# Patient Record
Sex: MALE | Race: WHITE | NOT HISPANIC OR LATINO | Employment: OTHER | URBAN - METROPOLITAN AREA
[De-identification: names, ages, dates, MRNs, and addresses within clinical notes are randomized per-mention and may not be internally consistent; named-entity substitution may affect disease eponyms.]

---

## 2020-03-09 ENCOUNTER — TRANSCRIBE ORDERS (OUTPATIENT)
Dept: ADMINISTRATIVE | Facility: HOSPITAL | Age: 45
End: 2020-03-09

## 2020-03-09 ENCOUNTER — HOSPITAL ENCOUNTER (OUTPATIENT)
Dept: GENERAL RADIOLOGY | Facility: HOSPITAL | Age: 45
Discharge: HOME | End: 2020-03-09
Admitting: NURSE PRACTITIONER

## 2020-03-09 DIAGNOSIS — M75.22 BICIPITAL TENDONITIS OF LEFT SHOULDER: Primary | ICD-10-CM

## 2020-03-09 DIAGNOSIS — M75.22 BICIPITAL TENDONITIS OF LEFT SHOULDER: ICD-10-CM

## 2020-03-09 PROCEDURE — 73030 X-RAY EXAM OF SHOULDER: CPT

## 2022-01-17 ENCOUNTER — OFFICE VISIT (OUTPATIENT)
Dept: NEUROSURGERY | Facility: CLINIC | Age: 47
End: 2022-01-17

## 2022-01-17 VITALS — RESPIRATION RATE: 18 BRPM | WEIGHT: 182.2 LBS | HEIGHT: 68 IN | BODY MASS INDEX: 27.61 KG/M2

## 2022-01-17 DIAGNOSIS — G89.29 CHRONIC NECK PAIN: Primary | ICD-10-CM

## 2022-01-17 DIAGNOSIS — M54.2 CHRONIC NECK PAIN: Primary | ICD-10-CM

## 2022-01-17 PROCEDURE — 99203 OFFICE O/P NEW LOW 30 MIN: CPT | Performed by: NEUROLOGICAL SURGERY

## 2022-01-17 NOTE — PROGRESS NOTES
Subjective     Chief Complaint: ***    Patient ID: Paul Vargas is a 46 y.o. male {Specialty - why patient here?:95552}    History of Present Illness    ***    The following portions of the patient's history were reviewed and updated as appropriate: allergies, current medications, past family history, past medical history, past social history, past surgical history and problem list.    Family history:   Family History   Problem Relation Age of Onset   • Fibromyalgia Mother    • Heart disease Mother    • Hypertension Mother    • Cancer Father    • Drug abuse Sister        Social history:   Social History     Socioeconomic History   • Marital status:    Tobacco Use   • Smoking status: Former Smoker   • Smokeless tobacco: Never Used   Vaping Use   • Vaping Use: Never used   Substance and Sexual Activity   • Alcohol use: Never   • Drug use: Yes     Types: Marijuana     Comment: recreational    • Sexual activity: Defer       Review of Systems   Constitutional: Negative for activity change, appetite change, chills, diaphoresis, fatigue, fever and unexpected weight change.   HENT: Negative for congestion, dental problem, drooling, ear discharge, ear pain, facial swelling, hearing loss, mouth sores, nosebleeds, postnasal drip, rhinorrhea, sinus pressure, sinus pain, sneezing, sore throat, tinnitus, trouble swallowing and voice change.    Eyes: Positive for visual disturbance. Negative for photophobia, pain, discharge, redness and itching.   Respiratory: Negative for apnea, cough, choking, chest tightness, shortness of breath, wheezing and stridor.    Cardiovascular: Negative for chest pain, palpitations and leg swelling.   Gastrointestinal: Negative for abdominal distention, abdominal pain, anal bleeding, blood in stool, constipation, diarrhea, nausea, rectal pain and vomiting.   Endocrine: Negative for cold intolerance, heat intolerance, polydipsia, polyphagia and polyuria.   Genitourinary: Negative for decreased  "urine volume, difficulty urinating, dysuria, enuresis, flank pain, frequency, genital sores, hematuria and urgency.   Musculoskeletal: Positive for back pain, myalgias, neck pain and neck stiffness. Negative for arthralgias, gait problem and joint swelling.   Skin: Negative for color change, pallor, rash and wound.   Allergic/Immunologic: Negative for environmental allergies, food allergies and immunocompromised state.   Neurological: Positive for numbness and headaches. Negative for dizziness, tremors, seizures, syncope, facial asymmetry, speech difficulty, weakness and light-headedness.   Hematological: Negative for adenopathy. Does not bruise/bleed easily.   Psychiatric/Behavioral: Positive for agitation. Negative for behavioral problems, confusion, decreased concentration, dysphoric mood, hallucinations, self-injury, sleep disturbance and suicidal ideas. The patient is not nervous/anxious and is not hyperactive.        Objective   Resp. rate 18, height 172.7 cm (68\"), weight 82.6 kg (182 lb 3.2 oz).  Body mass index is 27.7 kg/m².    Physical Exam    Assessment/Plan     Independent Review of Radiographic Studies:      ***    Medical Decision Making:      ***    There are no diagnoses linked to this encounter.    No follow-ups on file.           This document signed by MICHAELLE Guido MD January 17, 2022 12:00 EST      "

## 2022-01-17 NOTE — PROGRESS NOTES
"Subjective     Chief Complaint: Neck pain    Patient ID: Paul Vargas is a 46 y.o. male seen for consultation today at the request of  Nica Erickson,*    History of Present Illness    This is a 46-year-old man who presents to my office with a 6-year history of axial neck pain.  He has some orthopedic issues with his elbows and accordingly he does have some intermittent problems with arm pain and numbness, however this does not sound like it correlates all that well with his neck pain.  He is tried physical therapy in the past which has helped some.  He does not have much in the way of medical comorbidities.  He recently stopped smoking.  He states that he has avoided any sort of exercise or strenuous physical activity in the last 6 years due to fear of exacerbating his neck pain.    The following portions of the patient's history were reviewed and updated as appropriate: allergies, current medications, past family history, past medical history, past social history, past surgical history and problem list.    Family history:   Family History   Problem Relation Age of Onset   • Fibromyalgia Mother    • Heart disease Mother    • Hypertension Mother    • Cancer Father    • Drug abuse Sister        Social history:   Social History     Socioeconomic History   • Marital status:    Tobacco Use   • Smoking status: Former Smoker   • Smokeless tobacco: Never Used   Vaping Use   • Vaping Use: Never used   Substance and Sexual Activity   • Alcohol use: Never   • Drug use: Yes     Types: Marijuana     Comment: recreational    • Sexual activity: Defer       Review of Systems    Objective   Resp. rate 18, height 172.7 cm (68\"), weight 82.6 kg (182 lb 3.2 oz).  Body mass index is 27.7 kg/m².    Physical Exam  Vitals reviewed.   Constitutional:       General: He is not in acute distress.     Appearance: He is well-developed. He is not diaphoretic.   HENT:      Head: Normocephalic and atraumatic.   Pulmonary:      " Effort: Pulmonary effort is normal.   Musculoskeletal:      Cervical back: Pain with movement present. Decreased range of motion.      Comments: Currently endorses 0/10 neck pain   Skin:     General: Skin is warm and dry.   Neurological:      Mental Status: He is alert and oriented to person, place, and time.      Coordination: Romberg sign negative.      Gait: Gait and tandem walk normal.      Comments: Casual, toe raised, heel raised, and tandem gait all performed unremarkably.   Psychiatric:         Behavior: Behavior normal.         Assessment/Plan     Independent Review of Radiographic Studies:      Available for my review is a MRI of the cervical spine which was performed on 12/6/2021.  This demonstrates reversal of cervical lordosis.  There are small disc osteophyte complexes present at C4-5 and C5-6.  These do not significantly encroach upon the lateral recesses or neuroforamina.  The central canal is capacious.    Medical Decision Making:      This is a 46-year-old man with axial neck pain.  There is no role for surgery in the management of his chronic symptoms.  I would be happy to follow-up with him on an as-needed basis.    I made a referral to pain management and physical therapy.  I carefully reviewed the signs and symptoms of cervical radiculopathy and cervical myelopathy with him.  I directed him to contact my office with new or worsening symptoms.    Diagnoses and all orders for this visit:    1. Chronic neck pain (Primary)  -     Ambulatory Referral to Physical Therapy Evaluate and treat; Soft Tissue Mobilizaton; ROM, Strengthening, Stretching  -     Ambulatory Referral to Pain Management        No follow-ups on file.           This document signed by MICHAELLE Guido MD January 17, 2022 12:50 EST

## 2022-06-19 ENCOUNTER — HOSPITAL ENCOUNTER (EMERGENCY)
Facility: HOSPITAL | Age: 47
Discharge: HOME | End: 2022-06-19
Attending: EMERGENCY MEDICINE | Admitting: EMERGENCY MEDICINE

## 2022-06-19 ENCOUNTER — APPOINTMENT (OUTPATIENT)
Dept: CT IMAGING | Facility: HOSPITAL | Age: 47
End: 2022-06-19

## 2022-06-19 ENCOUNTER — APPOINTMENT (OUTPATIENT)
Dept: GENERAL RADIOLOGY | Facility: HOSPITAL | Age: 47
End: 2022-06-19

## 2022-06-19 VITALS
SYSTOLIC BLOOD PRESSURE: 125 MMHG | WEIGHT: 175 LBS | DIASTOLIC BLOOD PRESSURE: 75 MMHG | OXYGEN SATURATION: 97 % | TEMPERATURE: 98.6 F | HEART RATE: 84 BPM | HEIGHT: 68 IN | RESPIRATION RATE: 16 BRPM | BODY MASS INDEX: 26.52 KG/M2

## 2022-06-19 DIAGNOSIS — S93.602A FOOT SPRAIN, LEFT, INITIAL ENCOUNTER: Primary | ICD-10-CM

## 2022-06-19 PROCEDURE — 99282 EMERGENCY DEPT VISIT SF MDM: CPT

## 2022-06-19 PROCEDURE — 73610 X-RAY EXAM OF ANKLE: CPT

## 2022-06-19 PROCEDURE — 73700 CT LOWER EXTREMITY W/O DYE: CPT

## 2022-06-19 PROCEDURE — 73630 X-RAY EXAM OF FOOT: CPT

## 2022-06-20 NOTE — ED PROVIDER NOTES
Subjective   46 old male presents emergency room today with injury to the left foot.  He reports that he was stepping off a curb onto a cinder block when he inverted his left foot.  He is having quite a bit of ecchymosis and edema.  States he does not have extreme amount of pain.  He is very little to no pain of the ankle but does have swelling there.  No previous injuries.  No injury to the left knee.  He is not on an anticoagulant      History provided by:  Patient   used: No    Ankle Pain  Location:  Foot  Injury: yes    Foot location:  L foot  Pain details:     Quality:  Dull    Radiates to:  Does not radiate    Severity:  Mild    Onset quality:  Sudden    Timing:  Constant    Progression:  Unchanged  Chronicity:  New  Dislocation: no    Foreign body present:  No foreign bodies  Tetanus status:  Up to date  Prior injury to area:  No  Relieved by:  Ice and elevation  Worsened by:  Nothing  Ineffective treatments:  None tried  Associated symptoms: stiffness and swelling    Associated symptoms: no back pain, no decreased ROM, no fever, no itching, no neck pain and no numbness        Review of Systems   Constitutional: Negative for chills and fever.   Respiratory: Negative for chest tightness, shortness of breath and wheezing.    Cardiovascular: Negative for chest pain and palpitations.   Gastrointestinal: Negative for abdominal pain, diarrhea, nausea and vomiting.   Musculoskeletal: Positive for stiffness. Negative for back pain and neck pain.   Skin: Negative for itching, pallor and rash.   Psychiatric/Behavioral: Negative.    All other systems reviewed and are negative.      History reviewed. No pertinent past medical history.    No Known Allergies    Past Surgical History:   Procedure Laterality Date   • ELBOW PERCUTANEOUS PINNING Right 12/07/2021    [facility]       Family History   Problem Relation Age of Onset   • Fibromyalgia Mother    • Heart disease Mother    • Hypertension  Mother    • Cancer Father    • Drug abuse Sister        Social History     Socioeconomic History   • Marital status:    Tobacco Use   • Smoking status: Former Smoker   • Smokeless tobacco: Never Used   Vaping Use   • Vaping Use: Never used   Substance and Sexual Activity   • Alcohol use: Never   • Drug use: Yes     Types: Marijuana     Comment: recreational    • Sexual activity: Defer           Objective   Physical Exam  Vitals and nursing note reviewed.   Constitutional:       Appearance: He is well-developed.   HENT:      Head: Normocephalic and atraumatic.      Right Ear: External ear normal.      Left Ear: External ear normal.      Nose: Nose normal.   Eyes:      General: No scleral icterus.     Conjunctiva/sclera: Conjunctivae normal.      Pupils: Pupils are equal, round, and reactive to light.   Neck:      Thyroid: No thyromegaly.   Pulmonary:      Effort: Pulmonary effort is normal. No respiratory distress.   Musculoskeletal:      Comments: Left foot is significantly ecchymotic and edematous.  He has very minimal tenderness to palpation.  Ankles nontender over the lateral or medial malleolus.  Calcaneus nontender as well posterior tib ostracized pedis pulses are palpable sensations intact.  Tenderness of the fibular head.   Lymphadenopathy:      Cervical: No cervical adenopathy.   Skin:     General: Skin is warm and dry.   Neurological:      Mental Status: He is alert and oriented to person, place, and time.      Cranial Nerves: No cranial nerve deficit.      Coordination: Coordination normal.      Deep Tendon Reflexes: Reflexes are normal and symmetric. Reflexes normal.   Psychiatric:         Behavior: Behavior normal.         Thought Content: Thought content normal.         Judgment: Judgment normal.         Procedures           ED Course                                         No results found for this or any previous visit (from the past 24 hour(s)).  Note: In addition to lab results from this  visit, the labs listed above may include labs taken at another facility or during a different encounter within the last 24 hours. Please correlate lab times with ED admission and discharge times for further clarification of the services performed during this visit.    CT Lower Extremity Left Without Contrast   Final Result   No acute fracture or malalignment. The questioned fragment on earlier   radiographs corresponds to a chronic appearing triangular ossicle along   the dorsal lateral first metatarsal base compatible with os   intermetatarseum.       This report was finalized on 6/19/2022 8:13 PM by Artuhr Barksdale MD.          XR Ankle 3+ View Left   Final Result   Soft tissue swelling at the lateral ankle without acute fracture or   malalignment of the ankle.       A triangular possibly chronic appearing ossicle is noted between the   bases of the first and second metatarsals near the dorsal margin. This   is indeterminate. An age-indeterminate fracture fragment from Lisfranc   joint injury cannot be excluded and clinical correlation is required. If   there is concern for Lisfranc injury, weightbearing left foot   radiographs and/or CT/MRI would be recommended.       This report was finalized on 6/19/2022 7:15 PM by Arthur Barksdale MD.          XR Foot 3+ View Left   Final Result   Soft tissue swelling at the lateral ankle without acute fracture or   malalignment of the ankle.       A triangular possibly chronic appearing ossicle is noted between the   bases of the first and second metatarsals near the dorsal margin. This   is indeterminate. An age-indeterminate fracture fragment from Lisfranc   joint injury cannot be excluded and clinical correlation is required. If   there is concern for Lisfranc injury, weightbearing left foot   radiographs and/or CT/MRI would be recommended.       This report was finalized on 6/19/2022 7:15 PM by Arthur Barksdale MD.            Vitals:    06/19/22 1809   BP: 125/75   Patient  "Position: Sitting   Pulse: 84   Resp: 16   Temp: 98.6 °F (37 °C)   TempSrc: Oral   SpO2: 97%   Weight: 79.4 kg (175 lb)   Height: 172.7 cm (68\")     Medications - No data to display  ECG/EMG Results (last 24 hours)     ** No results found for the last 24 hours. **        No orders to display               MDM  Number of Diagnoses or Management Options  Foot sprain, left, initial encounter: new and requires workup     Amount and/or Complexity of Data Reviewed  Tests in the radiology section of CPT®: ordered and reviewed  Discuss the patient with other providers: yes    Patient Progress  Patient progress: stable      Final diagnoses:   Foot sprain, left, initial encounter       ED Disposition  ED Disposition     ED Disposition   Discharge    Condition   Stable    Comment   --             Nica Erickson MD  [address] [facility]  [address]  [place] [address]  [phone]          Branden Maharaj MD  [address] [facility] [address]  [place] [address]  [phone]      Call for appointment         Medication List      No changes were made to your prescriptions during this visit.          Lang Henning PA  06/21/22 0704    "

## 2023-02-16 ENCOUNTER — APPOINTMENT (OUTPATIENT)
Dept: GENERAL RADIOLOGY | Facility: HOSPITAL | Age: 48
End: 2023-02-16
Payer: COMMERCIAL

## 2023-02-16 ENCOUNTER — HOSPITAL ENCOUNTER (EMERGENCY)
Facility: HOSPITAL | Age: 48
Discharge: HOME | End: 2023-02-16
Attending: EMERGENCY MEDICINE | Admitting: EMERGENCY MEDICINE
Payer: COMMERCIAL

## 2023-02-16 VITALS
BODY MASS INDEX: 25.01 KG/M2 | HEIGHT: 68 IN | SYSTOLIC BLOOD PRESSURE: 121 MMHG | HEART RATE: 80 BPM | TEMPERATURE: 98.5 F | DIASTOLIC BLOOD PRESSURE: 83 MMHG | RESPIRATION RATE: 16 BRPM | WEIGHT: 165 LBS | OXYGEN SATURATION: 98 %

## 2023-02-16 DIAGNOSIS — M50.30 DDD (DEGENERATIVE DISC DISEASE), CERVICAL: ICD-10-CM

## 2023-02-16 DIAGNOSIS — M54.12 CERVICAL RADICULOPATHY: Primary | ICD-10-CM

## 2023-02-16 PROCEDURE — 96372 THER/PROPH/DIAG INJ SC/IM: CPT

## 2023-02-16 PROCEDURE — 72040 X-RAY EXAM NECK SPINE 2-3 VW: CPT

## 2023-02-16 PROCEDURE — 73030 X-RAY EXAM OF SHOULDER: CPT

## 2023-02-16 PROCEDURE — 99283 EMERGENCY DEPT VISIT LOW MDM: CPT

## 2023-02-16 PROCEDURE — 25010000002 KETOROLAC TROMETHAMINE PER 15 MG: Performed by: NURSE PRACTITIONER

## 2023-02-16 RX ORDER — DIAZEPAM 5 MG/1
10 TABLET ORAL ONCE
Status: COMPLETED | OUTPATIENT
Start: 2023-02-16 | End: 2023-02-16

## 2023-02-16 RX ORDER — HYDROCODONE BITARTRATE AND ACETAMINOPHEN 5; 325 MG/1; MG/1
1 TABLET ORAL ONCE
Status: COMPLETED | OUTPATIENT
Start: 2023-02-16 | End: 2023-02-16

## 2023-02-16 RX ORDER — METHOCARBAMOL 750 MG/1
750 TABLET, FILM COATED ORAL 3 TIMES DAILY PRN
Qty: 15 TABLET | Refills: 0 | Status: SHIPPED | OUTPATIENT
Start: 2023-02-16

## 2023-02-16 RX ORDER — KETOROLAC TROMETHAMINE 30 MG/ML
30 INJECTION, SOLUTION INTRAMUSCULAR; INTRAVENOUS ONCE
Status: COMPLETED | OUTPATIENT
Start: 2023-02-16 | End: 2023-02-16

## 2023-02-16 RX ORDER — IBUPROFEN 800 MG/1
800 TABLET ORAL
Qty: 15 TABLET | Refills: 0 | Status: SHIPPED | OUTPATIENT
Start: 2023-02-16

## 2023-02-16 RX ADMIN — KETOROLAC TROMETHAMINE 30 MG: 30 INJECTION, SOLUTION INTRAMUSCULAR; INTRAVENOUS at 13:49

## 2023-02-16 RX ADMIN — HYDROCODONE BITARTRATE AND ACETAMINOPHEN 1 TABLET: 5; 325 TABLET ORAL at 13:49

## 2023-02-16 RX ADMIN — DIAZEPAM 10 MG: 5 TABLET ORAL at 13:49

## 2023-02-16 NOTE — ED PROVIDER NOTES
Subjective   History of Present Illness  Paul Vargas is a 47 yr old male presents to the emergency department for complaints of neck pain, shoulder pain.  Patient has had issues with degenerative disc for years.  Patient reports that at one point he was told he had a pinched nerve or herniated disc. Pt reports that the pain radiates into the LT upper extremity.      History provided by:  Patient   used: No    Neck Pain  Pain location:  L side  Quality:  Aching and shooting  Pain radiates to:  L shoulder and L arm  Pain severity:  Moderate  Timing:  Constant  Progression:  Worsening  Associated symptoms: tingling    Associated symptoms: no chest pain, no fever, no numbness and no weakness        Review of Systems   Constitutional: Negative for fever.   Respiratory: Negative for shortness of breath.    Cardiovascular: Negative for chest pain.   Musculoskeletal: Positive for neck pain.   Neurological: Positive for tingling. Negative for weakness and numbness.   All other systems reviewed and are negative.      No past medical history on file.    No Known Allergies    Past Surgical History:   Procedure Laterality Date   • ELBOW PERCUTANEOUS PINNING Right 12/07/2021    [facility]       Family History   Problem Relation Age of Onset   • Fibromyalgia Mother    • Heart disease Mother    • Hypertension Mother    • Cancer Father    • Drug abuse Sister        Social History     Socioeconomic History   • Marital status:    Tobacco Use   • Smoking status: Former   • Smokeless tobacco: Never   Vaping Use   • Vaping Use: Never used   Substance and Sexual Activity   • Alcohol use: Never   • Drug use: Yes     Types: Marijuana     Comment: recreational    • Sexual activity: Defer           Objective   Physical Exam  Nursing note reviewed.   Constitutional:       Appearance: Normal appearance. He is well-developed. He is not toxic-appearing.   HENT:      Head: Normocephalic and atraumatic.    Eyes:      General: Lids are normal.      Conjunctiva/sclera: Conjunctivae normal.   Neck:      Trachea: Trachea normal.   Cardiovascular:      Rate and Rhythm: Regular rhythm.      Pulses: Normal pulses.      Heart sounds: Normal heart sounds.   Pulmonary:      Effort: Pulmonary effort is normal. No respiratory distress.      Breath sounds: Normal breath sounds. No decreased breath sounds, wheezing, rhonchi or rales.   Abdominal:      General: Bowel sounds are normal.      Palpations: Abdomen is soft.      Tenderness: There is no abdominal tenderness.   Musculoskeletal:      Cervical back: Tenderness (Lt lateral neck pain radiates into Lt trapezius. ) present. Decreased range of motion.      Thoracic back: Normal.      Lumbar back: Normal.   Skin:     General: Skin is warm and dry.      Findings: No rash.   Neurological:      Mental Status: He is alert and oriented to person, place, and time.      Cranial Nerves: No cranial nerve deficit.   Psychiatric:         Speech: Speech normal.         Behavior: Behavior normal. Behavior is cooperative.         Procedures           ED Course  ED Course as of 02/16/23 2014   Thu Feb 16, 2023   [id number] Paul Vargas is a 47 yr old male that presents emergency department for complaints of pain that radiates into his left shoulder.  He advises he has a history of bulging disc in his cervical spine.  Patient reports he was stretching 2 days ago and felt a pop.  Now he is got electrical type shock through his left arm.  Pain increases with movement of his head. Turning.     Differential DX: Cervical radiculopathy.  Degenerative disc, herniated disc, shoulder strain.   [KG]   1414 Imaging reviewed.  Left shoulder is unremarkable.  Cervical spine shows some degenerative disc at C7-T1. [KG]   1414 Patient will be discharged home.  Patient to take Robaxin 750 mg 1 p.o. 3 times daily as needed muscle spasm.  Patient take ibuprofen 800 mg 1 p.o. 3 times daily as needed pain and  "inflammation.  If pain continues patient is to follow-up with his neurosurgeon.  If pain continues patient will need an MRI of his cervical spine. [KG]      ED Course User Index  [KG] Irasema Muro POOJA, APRN           No results found for this or any previous visit (from the past 24 hour(s)).  Note: In addition to lab results from this visit, the labs listed above may include labs taken at another facility or during a different encounter within the last 24 hours. Please correlate lab times with ED admission and discharge times for further clarification of the services performed during this visit.    XR Shoulder 2+ View Left   Final Result   Impression:   No evidence of acute or healing right shoulder trauma. If there is strong clinical concern for internal derangement, shoulder MRI could be considered.      Electronically Signed: Rick Boyle     2/16/2023 2:05 PM EST     Workstation ID: [id number]      XR Spine Cervical 2 View   Final Result   Impression:   Facet degenerative changes at C7-T1 otherwise negative cervical spine      Electronically Signed: Lang Anderson     2/16/2023 2:01 PM EST     Workstation ID: [id number]        Vitals:    02/16/23 1248   BP: 121/83   Patient Position: Sitting   Pulse: 80   Resp: 16   Temp: 98.5 °F (36.9 °C)   TempSrc: Oral   SpO2: 98%   Weight: 74.8 kg (165 lb)   Height: 172.7 cm (68\")     Medications   ketorolac (TORADOL) injection 30 mg (30 mg Intramuscular Given 2/16/23 1349)   HYDROcodone-acetaminophen (NORCO) 5-325 MG per tablet 1 tablet (1 tablet Oral Given 2/16/23 1349)   diazePAM (VALIUM) tablet 10 mg (10 mg Oral Given 2/16/23 1349)     ECG/EMG Results (last 24 hours)     ** No results found for the last 24 hours. **        No orders to display                                       MDM    Final diagnoses:   Cervical radiculopathy   DDD (degenerative disc disease), cervical       ED Disposition  ED Disposition     ED Disposition   Discharge    Condition   Stable    Comment   -- "             Nica Erickson MD  [address] [facility]  [address]  [place] [id number]  [phone]               Medication List      New Prescriptions    ibuprofen 800 MG tablet  Commonly known as: ADVIL,MOTRIN  Take 1 tablet by mouth 3 (Three) Times a Day With Meals.     methocarbamol 750 MG tablet  Commonly known as: ROBAXIN  Take 1 tablet by mouth 3 (Three) Times a Day As Needed for Muscle Spasms.           Where to Get Your Medications      These medications were sent to [facility]/pharmacy #[id number] - [place], [place] - [address] [facility] [phone] [facility] [phone] [address] [address]    Phone: [phone]   · ibuprofen 800 MG tablet  · methocarbamol 750 MG tablet          Irasema Muro, APRN  02/16/23 2014

## 2023-02-16 NOTE — DISCHARGE INSTRUCTIONS
Take meds as ordered.    If pain continues she will need an MRI of your neck.    Follow-up with neurosurgery.  You may need physical therapy again.

## 2024-03-10 ENCOUNTER — APPOINTMENT (OUTPATIENT)
Dept: CT IMAGING | Facility: HOSPITAL | Age: 49
End: 2024-03-10
Payer: COMMERCIAL

## 2024-03-10 ENCOUNTER — HOSPITAL ENCOUNTER (OUTPATIENT)
Facility: HOSPITAL | Age: 49
Setting detail: OBSERVATION
Discharge: LEFT BEFORE DISCHARGE | End: 2024-03-11
Attending: EMERGENCY MEDICINE | Admitting: STUDENT IN AN ORGANIZED HEALTH CARE EDUCATION/TRAINING PROGRAM
Payer: COMMERCIAL

## 2024-03-10 DIAGNOSIS — R10.31 RIGHT LOWER QUADRANT ABDOMINAL PAIN: ICD-10-CM

## 2024-03-10 DIAGNOSIS — K35.30 ACUTE APPENDICITIS WITH LOCALIZED PERITONITIS, WITHOUT PERFORATION, ABSCESS, OR GANGRENE: Primary | ICD-10-CM

## 2024-03-10 DIAGNOSIS — D72.829 LEUKOCYTOSIS, UNSPECIFIED TYPE: ICD-10-CM

## 2024-03-10 DIAGNOSIS — F17.200 TOBACCO DEPENDENCE: ICD-10-CM

## 2024-03-10 DIAGNOSIS — R19.7 DIARRHEA, UNSPECIFIED TYPE: ICD-10-CM

## 2024-03-10 DIAGNOSIS — R68.83 CHILLS (WITHOUT FEVER): ICD-10-CM

## 2024-03-10 DIAGNOSIS — R11.0 NAUSEA: ICD-10-CM

## 2024-03-10 PROBLEM — K35.80 ACUTE APPENDICITIS: Status: ACTIVE | Noted: 2024-03-10

## 2024-03-10 LAB
ALBUMIN SERPL-MCNC: 4 G/DL (ref 3.5–5.2)
ALBUMIN/GLOB SERPL: 1.4 G/DL
ALP SERPL-CCNC: 92 U/L (ref 39–117)
ALT SERPL W P-5'-P-CCNC: 6 U/L (ref 1–41)
ANION GAP SERPL CALCULATED.3IONS-SCNC: 10 MMOL/L (ref 5–15)
AST SERPL-CCNC: 10 U/L (ref 1–40)
BASOPHILS # BLD AUTO: 0.03 10*3/MM3 (ref 0–0.2)
BASOPHILS NFR BLD AUTO: 0.2 % (ref 0–1.5)
BILIRUB SERPL-MCNC: 0.5 MG/DL (ref 0–1.2)
BILIRUB UR QL STRIP: NEGATIVE
BUN SERPL-MCNC: 11 MG/DL (ref 6–20)
BUN/CREAT SERPL: 13.9 (ref 7–25)
CALCIUM SPEC-SCNC: 8.8 MG/DL (ref 8.6–10.5)
CHLORIDE SERPL-SCNC: 100 MMOL/L (ref 98–107)
CLARITY UR: CLEAR
CO2 SERPL-SCNC: 26 MMOL/L (ref 22–29)
COLOR UR: YELLOW
CREAT SERPL-MCNC: 0.79 MG/DL (ref 0.76–1.27)
D-LACTATE SERPL-SCNC: 1.4 MMOL/L (ref 0.5–2)
DEPRECATED RDW RBC AUTO: 42.5 FL (ref 37–54)
EGFRCR SERPLBLD CKD-EPI 2021: 111 ML/MIN/1.73
EOSINOPHIL # BLD AUTO: 0.24 10*3/MM3 (ref 0–0.4)
EOSINOPHIL NFR BLD AUTO: 1.9 % (ref 0.3–6.2)
ERYTHROCYTE [DISTWIDTH] IN BLOOD BY AUTOMATED COUNT: 12.3 % (ref 12.3–15.4)
GLOBULIN UR ELPH-MCNC: 2.8 GM/DL
GLUCOSE SERPL-MCNC: 117 MG/DL (ref 65–99)
GLUCOSE UR STRIP-MCNC: NEGATIVE MG/DL
HCT VFR BLD AUTO: 43.5 % (ref 37.5–51)
HGB BLD-MCNC: 14.7 G/DL (ref 13–17.7)
HGB UR QL STRIP.AUTO: NEGATIVE
HOLD SPECIMEN: NORMAL
HOLD SPECIMEN: NORMAL
IMM GRANULOCYTES # BLD AUTO: 0.05 10*3/MM3 (ref 0–0.05)
IMM GRANULOCYTES NFR BLD AUTO: 0.4 % (ref 0–0.5)
KETONES UR QL STRIP: NEGATIVE
LEUKOCYTE ESTERASE UR QL STRIP.AUTO: NEGATIVE
LIPASE SERPL-CCNC: 23 U/L (ref 13–60)
LYMPHOCYTES # BLD AUTO: 2.66 10*3/MM3 (ref 0.7–3.1)
LYMPHOCYTES NFR BLD AUTO: 21.3 % (ref 19.6–45.3)
MCH RBC QN AUTO: 31.2 PG (ref 26.6–33)
MCHC RBC AUTO-ENTMCNC: 33.8 G/DL (ref 31.5–35.7)
MCV RBC AUTO: 92.4 FL (ref 79–97)
MONOCYTES # BLD AUTO: 0.86 10*3/MM3 (ref 0.1–0.9)
MONOCYTES NFR BLD AUTO: 6.9 % (ref 5–12)
NEUTROPHILS NFR BLD AUTO: 69.3 % (ref 42.7–76)
NEUTROPHILS NFR BLD AUTO: 8.63 10*3/MM3 (ref 1.7–7)
NITRITE UR QL STRIP: NEGATIVE
NRBC BLD AUTO-RTO: 0 /100 WBC (ref 0–0.2)
PH UR STRIP.AUTO: 5.5 [PH] (ref 5–8)
PLATELET # BLD AUTO: 240 10*3/MM3 (ref 140–450)
PMV BLD AUTO: 9.8 FL (ref 6–12)
POTASSIUM SERPL-SCNC: 3.6 MMOL/L (ref 3.5–5.2)
PROT SERPL-MCNC: 6.8 G/DL (ref 6–8.5)
PROT UR QL STRIP: NEGATIVE
RBC # BLD AUTO: 4.71 10*6/MM3 (ref 4.14–5.8)
SODIUM SERPL-SCNC: 136 MMOL/L (ref 136–145)
SP GR UR STRIP: 1.04 (ref 1–1.03)
UROBILINOGEN UR QL STRIP: ABNORMAL
WBC NRBC COR # BLD AUTO: 12.47 10*3/MM3 (ref 3.4–10.8)
WHOLE BLOOD HOLD COAG: NORMAL
WHOLE BLOOD HOLD SPECIMEN: NORMAL

## 2024-03-10 PROCEDURE — G0378 HOSPITAL OBSERVATION PER HR: HCPCS

## 2024-03-10 PROCEDURE — 81003 URINALYSIS AUTO W/O SCOPE: CPT

## 2024-03-10 PROCEDURE — 96375 TX/PRO/DX INJ NEW DRUG ADDON: CPT

## 2024-03-10 PROCEDURE — 25010000002 KETOROLAC TROMETHAMINE PER 15 MG: Performed by: PHYSICIAN ASSISTANT

## 2024-03-10 PROCEDURE — 96374 THER/PROPH/DIAG INJ IV PUSH: CPT

## 2024-03-10 PROCEDURE — 85025 COMPLETE CBC W/AUTO DIFF WBC: CPT

## 2024-03-10 PROCEDURE — 80053 COMPREHEN METABOLIC PANEL: CPT

## 2024-03-10 PROCEDURE — 83690 ASSAY OF LIPASE: CPT

## 2024-03-10 PROCEDURE — 74177 CT ABD & PELVIS W/CONTRAST: CPT

## 2024-03-10 PROCEDURE — 25810000003 SODIUM CHLORIDE 0.9 % SOLUTION: Performed by: PHYSICIAN ASSISTANT

## 2024-03-10 PROCEDURE — 83605 ASSAY OF LACTIC ACID: CPT | Performed by: PHYSICIAN ASSISTANT

## 2024-03-10 PROCEDURE — 99285 EMERGENCY DEPT VISIT HI MDM: CPT

## 2024-03-10 PROCEDURE — 87040 BLOOD CULTURE FOR BACTERIA: CPT | Performed by: PHYSICIAN ASSISTANT

## 2024-03-10 PROCEDURE — 25010000002 ONDANSETRON PER 1 MG: Performed by: PHYSICIAN ASSISTANT

## 2024-03-10 PROCEDURE — 36415 COLL VENOUS BLD VENIPUNCTURE: CPT

## 2024-03-10 PROCEDURE — 25010000002 PIPERACILLIN SOD-TAZOBACTAM PER 1 G: Performed by: PHYSICIAN ASSISTANT

## 2024-03-10 PROCEDURE — 25510000001 IOPAMIDOL 61 % SOLUTION: Performed by: EMERGENCY MEDICINE

## 2024-03-10 RX ORDER — SODIUM CHLORIDE 9 MG/ML
10 INJECTION, SOLUTION INTRAMUSCULAR; INTRAVENOUS; SUBCUTANEOUS AS NEEDED
Status: DISCONTINUED | OUTPATIENT
Start: 2024-03-10 | End: 2024-03-11

## 2024-03-10 RX ORDER — KETOROLAC TROMETHAMINE 30 MG/ML
30 INJECTION, SOLUTION INTRAMUSCULAR; INTRAVENOUS ONCE
Status: COMPLETED | OUTPATIENT
Start: 2024-03-10 | End: 2024-03-10

## 2024-03-10 RX ORDER — ONDANSETRON 2 MG/ML
4 INJECTION INTRAMUSCULAR; INTRAVENOUS ONCE
Status: COMPLETED | OUTPATIENT
Start: 2024-03-10 | End: 2024-03-10

## 2024-03-10 RX ADMIN — PIPERACILLIN AND TAZOBACTAM 3.38 G: 3; .375 INJECTION, POWDER, LYOPHILIZED, FOR SOLUTION INTRAVENOUS at 23:39

## 2024-03-10 RX ADMIN — SODIUM CHLORIDE 1000 ML: 9 INJECTION, SOLUTION INTRAVENOUS at 21:34

## 2024-03-10 RX ADMIN — KETOROLAC TROMETHAMINE 30 MG: 30 INJECTION, SOLUTION INTRAMUSCULAR; INTRAVENOUS at 21:34

## 2024-03-10 RX ADMIN — ONDANSETRON 4 MG: 2 INJECTION INTRAMUSCULAR; INTRAVENOUS at 21:33

## 2024-03-10 RX ADMIN — IOPAMIDOL 100 ML: 612 INJECTION, SOLUTION INTRAVENOUS at 21:07

## 2024-03-11 VITALS
BODY MASS INDEX: 26.55 KG/M2 | OXYGEN SATURATION: 92 % | TEMPERATURE: 98 F | DIASTOLIC BLOOD PRESSURE: 67 MMHG | HEART RATE: 67 BPM | WEIGHT: 175.2 LBS | SYSTOLIC BLOOD PRESSURE: 111 MMHG | RESPIRATION RATE: 18 BRPM | HEIGHT: 68 IN

## 2024-03-11 LAB — HOLD SPECIMEN: NORMAL

## 2024-03-11 PROCEDURE — G0378 HOSPITAL OBSERVATION PER HR: HCPCS

## 2024-03-11 PROCEDURE — 25810000003 LACTATED RINGERS PER 1000 ML: Performed by: STUDENT IN AN ORGANIZED HEALTH CARE EDUCATION/TRAINING PROGRAM

## 2024-03-11 RX ORDER — BISACODYL 5 MG/1
5 TABLET, DELAYED RELEASE ORAL DAILY PRN
Status: DISCONTINUED | OUTPATIENT
Start: 2024-03-11 | End: 2024-03-11 | Stop reason: HOSPADM

## 2024-03-11 RX ORDER — TRAMADOL HYDROCHLORIDE 50 MG/1
50 TABLET ORAL EVERY 6 HOURS PRN
Status: DISCONTINUED | OUTPATIENT
Start: 2024-03-11 | End: 2024-03-11 | Stop reason: HOSPADM

## 2024-03-11 RX ORDER — ACETAMINOPHEN 325 MG/1
650 TABLET ORAL EVERY 4 HOURS PRN
Status: DISCONTINUED | OUTPATIENT
Start: 2024-03-11 | End: 2024-03-11 | Stop reason: HOSPADM

## 2024-03-11 RX ORDER — SODIUM CHLORIDE 0.9 % (FLUSH) 0.9 %
10 SYRINGE (ML) INJECTION AS NEEDED
Status: DISCONTINUED | OUTPATIENT
Start: 2024-03-11 | End: 2024-03-11 | Stop reason: HOSPADM

## 2024-03-11 RX ORDER — POLYETHYLENE GLYCOL 3350 17 G/17G
17 POWDER, FOR SOLUTION ORAL DAILY PRN
Status: DISCONTINUED | OUTPATIENT
Start: 2024-03-11 | End: 2024-03-11 | Stop reason: HOSPADM

## 2024-03-11 RX ORDER — METHOCARBAMOL 750 MG/1
750 TABLET, FILM COATED ORAL 3 TIMES DAILY PRN
Status: DISCONTINUED | OUTPATIENT
Start: 2024-03-11 | End: 2024-03-11 | Stop reason: HOSPADM

## 2024-03-11 RX ORDER — SODIUM CHLORIDE 0.9 % (FLUSH) 0.9 %
10 SYRINGE (ML) INJECTION EVERY 12 HOURS SCHEDULED
Status: DISCONTINUED | OUTPATIENT
Start: 2024-03-11 | End: 2024-03-11 | Stop reason: HOSPADM

## 2024-03-11 RX ORDER — ONDANSETRON 4 MG/1
4 TABLET, ORALLY DISINTEGRATING ORAL EVERY 6 HOURS PRN
Status: DISCONTINUED | OUTPATIENT
Start: 2024-03-11 | End: 2024-03-11 | Stop reason: HOSPADM

## 2024-03-11 RX ORDER — SODIUM CHLORIDE, SODIUM LACTATE, POTASSIUM CHLORIDE, CALCIUM CHLORIDE 600; 310; 30; 20 MG/100ML; MG/100ML; MG/100ML; MG/100ML
100 INJECTION, SOLUTION INTRAVENOUS CONTINUOUS
Status: DISCONTINUED | OUTPATIENT
Start: 2024-03-11 | End: 2024-03-11 | Stop reason: HOSPADM

## 2024-03-11 RX ORDER — ONDANSETRON 2 MG/ML
4 INJECTION INTRAMUSCULAR; INTRAVENOUS EVERY 6 HOURS PRN
Status: DISCONTINUED | OUTPATIENT
Start: 2024-03-11 | End: 2024-03-11 | Stop reason: HOSPADM

## 2024-03-11 RX ORDER — AMOXICILLIN AND CLAVULANATE POTASSIUM 875; 125 MG/1; MG/1
1 TABLET, FILM COATED ORAL 2 TIMES DAILY
Qty: 14 TABLET | Refills: 0 | Status: SHIPPED | OUTPATIENT
Start: 2024-03-11 | End: 2024-03-18

## 2024-03-11 RX ORDER — SODIUM CHLORIDE 9 MG/ML
40 INJECTION, SOLUTION INTRAVENOUS AS NEEDED
Status: DISCONTINUED | OUTPATIENT
Start: 2024-03-11 | End: 2024-03-11 | Stop reason: HOSPADM

## 2024-03-11 RX ORDER — AMOXICILLIN 250 MG
2 CAPSULE ORAL 2 TIMES DAILY PRN
Status: DISCONTINUED | OUTPATIENT
Start: 2024-03-11 | End: 2024-03-11 | Stop reason: HOSPADM

## 2024-03-11 RX ORDER — IBUPROFEN 800 MG/1
800 TABLET ORAL
Status: DISCONTINUED | OUTPATIENT
Start: 2024-03-11 | End: 2024-03-11 | Stop reason: HOSPADM

## 2024-03-11 RX ORDER — BISACODYL 10 MG
10 SUPPOSITORY, RECTAL RECTAL DAILY PRN
Status: DISCONTINUED | OUTPATIENT
Start: 2024-03-11 | End: 2024-03-11 | Stop reason: HOSPADM

## 2024-03-11 RX ORDER — NALOXONE HCL 0.4 MG/ML
0.4 VIAL (ML) INJECTION
Status: DISCONTINUED | OUTPATIENT
Start: 2024-03-11 | End: 2024-03-11 | Stop reason: HOSPADM

## 2024-03-11 RX ORDER — HYDROMORPHONE HYDROCHLORIDE 1 MG/ML
0.5 INJECTION, SOLUTION INTRAMUSCULAR; INTRAVENOUS; SUBCUTANEOUS
Status: DISCONTINUED | OUTPATIENT
Start: 2024-03-11 | End: 2024-03-11 | Stop reason: HOSPADM

## 2024-03-11 RX ADMIN — SODIUM CHLORIDE, POTASSIUM CHLORIDE, SODIUM LACTATE AND CALCIUM CHLORIDE 100 ML/HR: 600; 310; 30; 20 INJECTION, SOLUTION INTRAVENOUS at 05:23

## 2024-03-11 NOTE — ED NOTES
" Paul Vargas    Nursing Report ED to Floor:  Mental status: AXO X4  Ambulatory status: INDEPENDENT  Oxygen Therapy:  ROOM AIR  Cardiac Rhythm: NSR  Admitted from: HOME  Safety Concerns:  NONE  Social Issues: NONE  ED Room #:  2    ED Nurse Phone Extension - [phone] or may call [phone].      HPI:   Chief Complaint   Patient presents with    Abdominal Pain       Past Medical History:  No past medical history on file.     Past Surgical History:  Past Surgical History:   Procedure Laterality Date    ELBOW PERCUTANEOUS PINNING Right 12/07/2021    [facility]        Admitting Doctor:   No admitting provider for patient encounter.    Consulting Provider(s):  Consults       No orders found from 2/10/2024 to 3/11/2024.             Admitting Diagnosis:   There were no encounter diagnoses.    Most Recent Vitals:   Vitals:    03/10/24 2018 03/10/24 2200 03/10/24 2206   BP: 118/87 105/73    BP Location: Left arm     Patient Position: Sitting     Pulse: 90 61 62   Resp: 18     Temp: 98.2 °F (36.8 °C)     TempSrc: Oral     SpO2: 96% 96% 94%   Weight: 79.4 kg (175 lb)     Height: 172.7 cm (68\")         Active LDAs/IV Access:   Lines, Drains & Airways       Active LDAs       Name Placement date Placement time Site Days    Peripheral IV 03/10/24 2032 Right Antecubital 03/10/24  2032  Antecubital  less than 1                    Labs (abnormal labs have a star):   Labs Reviewed   COMPREHENSIVE METABOLIC PANEL - Abnormal; Notable for the following components:       Result Value    Glucose 117 (*)     All other components within normal limits    Narrative:     GFR Normal >60  Chronic Kidney Disease <60  Kidney Failure <15     URINALYSIS W/ MICROSCOPIC IF INDICATED (NO CULTURE) - Abnormal; Notable for the following components:    Specific Gravity, UA 1.037 (*)     All other components within normal limits    Narrative:     Urine microscopic not indicated.   CBC WITH AUTO DIFFERENTIAL - Abnormal; Notable for the following components: "    WBC 12.47 (*)     Neutrophils, Absolute 8.63 (*)     All other components within normal limits   LIPASE - Normal   LACTIC ACID, PLASMA - Normal   BLOOD CULTURE   BLOOD CULTURE   RAINBOW DRAW    Narrative:     The following orders were created for panel order [place] Draw.  Procedure                               Abnormality         Status                     ---------                               -----------         ------                     Green Top (Gel)[[id number]]                                  Final result               Lavender Top[[id number]]                                     Final result               Gold Top - SST[[id number]]                                   Final result               Faria Top[[id number]]                                         In process                 Light Blue Top[[id number]]                                   Final result                 Please view results for these tests on the individual orders.   CBC AND DIFFERENTIAL    Narrative:     The following orders were created for panel order CBC & Differential.  Procedure                               Abnormality         Status                     ---------                               -----------         ------                     CBC Auto Differential[[id number]]        Abnormal            Final result                 Please view results for these tests on the individual orders.   GREEN TOP   LAVENDER TOP   GOLD TOP - SST   LIGHT BLUE TOP   GRAY TOP       Meds Given in ED:   Medications   Sodium Chloride (PF) 0.9 % 10 mL (has no administration in time range)   piperacillin-tazobactam (ZOSYN) 3.375 g IVPB in 100 mL NS MBP (CD) (has no administration in time range)   sodium chloride 0.9 % bolus 1,000 mL (1,000 mL Intravenous New Bag 3/10/24 2134)   ketorolac (TORADOL) injection 30 mg (30 mg Intravenous Given 3/10/24 2134)   ondansetron (ZOFRAN) injection 4 mg (4 mg Intravenous Given 3/10/24 2133)   iopamidol (ISOVUE-300) 61 %  injection 100 mL (100 mL Intravenous Given 3/10/24 1178)
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
## 2024-03-11 NOTE — PLAN OF CARE
Problem: Adult Inpatient Plan of Care  Goal: Plan of Care Review  Outcome: Ongoing, Progressing  Goal: Patient-Specific Goal (Individualized)  Outcome: Ongoing, Progressing  Goal: Absence of Hospital-Acquired Illness or Injury  Outcome: Ongoing, Progressing  Intervention: Identify and Manage Fall Risk  Description: Perform standard risk assessment on admission using a validated tool or comprehensive approach appropriate to the patient; reassess fall risk frequently, with change in status or transfer to another level of care.  Communicate fall injury risk to interprofessional healthcare team.  Determine need for increased observation, equipment and environmental modification, such as low bed, signage and supportive, nonskid footwear.  Adjust safety measures to individual developmental age, stage and identified risk factors.  Reinforce the importance of safety and physical activity with patient and family.  Perform regular intentional rounding to assess need for position change, pain assessment and personal needs, including assistance with toileting.  Recent Flowsheet Documentation  Taken 3/11/2024 0400 by Robin Harper RN  Safety Promotion/Fall Prevention:   activity supervised   safety round/check completed  Taken 3/11/2024 0200 by Robin Harper RN  Safety Promotion/Fall Prevention:   activity supervised   nonskid shoes/slippers when out of bed   safety round/check completed  Taken 3/11/2024 0000 by Robin Harper, RN  Safety Promotion/Fall Prevention:   activity supervised   safety round/check completed   nonskid shoes/slippers when out of bed  Intervention: Prevent Skin Injury  Description: Perform a screening for skin injury risk, such as pressure or moisture associated skin damage on admission and at regular intervals throughout hospital stay.  Keep all areas of skin (especially folds) clean and dry.  Maintain adequate skin hydration.  Relieve and redistribute pressure and protect bony prominences;  implement measures based on patient-specific risk factors.  Match turning and repositioning schedule to clinical condition.  Encourage weight shift frequently; assist with reposition if unable to complete independently.  Float heels off bed; avoid pressure on the Achilles tendon.  Keep skin free from extended contact with medical devices.  Encourage functional activity and mobility, as early as tolerated.  Use aids (e.g., slide boards, mechanical lift) during transfer.  Recent Flowsheet Documentation  Taken 3/11/2024 0400 by Robin Harper RN  Body Position: position changed independently  Taken 3/11/2024 0200 by Robin Harper RN  Body Position: position changed independently  Taken 3/11/2024 0000 by Robin Harper RN  Body Position: position changed independently  Intervention: Prevent and Manage VTE (Venous Thromboembolism) Risk  Description: Assess for VTE (venous thromboembolism) risk.  Encourage and assist with early ambulation.  Initiate and maintain compression or other therapy, as indicated, based on identified risk in accordance with organizational protocol and provider order.  Encourage both active and passive leg exercises while in bed, if unable to ambulate.  Recent Flowsheet Documentation  Taken 3/11/2024 0400 by Robin Harper RN  Activity Management: activity encouraged  Taken 3/11/2024 0200 by Robin Harper RN  Activity Management: activity encouraged  Taken 3/11/2024 0000 by Robin Harper RN  Activity Management: activity encouraged  Goal: Optimal Comfort and Wellbeing  Outcome: Ongoing, Progressing  Intervention: Monitor Pain and Promote Comfort  Description: Assess pain level, treatment efficacy and patient response at regular intervals using a consistent pain scale.  Consider the presence and impact of preexisting chronic pain.  Encourage patient and caregiver involvement in pain assessment, interventions and safety measures.  Recent Flowsheet Documentation  Taken 3/11/2024 0000 by  Robin Harper, RN  Pain Management Interventions: (refused meds)   quiet environment facilitated   other (see comments)  Intervention: Provide Person-Centered Care  Description: Use a family-focused approach to care.  Develop trust and rapport by proactively providing information, encouraging questions, addressing concerns and offering reassurance.  Acknowledge emotional response to hospitalization.  Recognize and utilize personal coping strategies.  Honor spiritual and cultural preferences.  Recent Flowsheet Documentation  Taken 3/11/2024 0000 by Robin Harper, RN  Trust Relationship/Rapport:   care explained   choices provided   questions answered   questions encouraged  Goal: Readiness for Transition of Care  Outcome: Ongoing, Progressing  Intervention: Mutually Develop Transition Plan  Description: Identify available resources for support (e.g., family, friends, community).  Identify and address barriers to ongoing treatment and home management (e.g., environmental, financial).  Provide opportunities to practice self-management skills.  Assess and monitor emotional readiness for transition.  Establish or reconnect linkage with outpatient providers or community-based services.  Recent Flowsheet Documentation  Taken 3/10/2024 2344 by Robin Harper, RN  Transportation Anticipated: family or friend will provide  Patient/Family Anticipated Services at Transition: none  Patient/Family Anticipates Transition to: home  Taken 3/10/2024 2343 by Robin Harper, RN  Equipment Currently Used at Home: none   Goal Outcome Evaluation:

## 2024-03-11 NOTE — NURSING NOTE
Notified by primary RN that pt left AMA with IV in place around 0853. House supervisor and security notified. Patient's wife was still on floor and contacted patient and was able to get him to come back up to the floor. I removed IV at 0908.

## 2024-03-11 NOTE — PROGRESS NOTES
Event Note    I was called to the patient's bedside due to a notification that patient was upset and would prefer discharge rather than staying for surgery.    Upon speaking to patient, he was angry that we did not proceed with surgery immediately upon him arriving last night, and felt that he was being taken advantage of by being kept in the hospital overnight. His wife had previously had her appendix taken out at [facility], and per their report, she was taken to the OR overnight and discharged in the morning. He stated that he had been told that if he couldn't get an appendectomy today that I would keep him here another 24 hours. He was also angry that a clear liquid diet tray was brought to him this morning, and felt that we were trying to feed him in order to delay his surgery and keep him in the hospital. He did consume a very small amount of clear liquids from the tray including a spoon of chicken broth and an apple juice, but he was reassured that this would not delay his surgery.    I explained that I did not feel he should wait another 24 hours, and that per my original recommendation, I recommended he proceed to the OR this morning when an OR became available. I counseled him that I recommended he be made NPO at midnight, thus he should not have gotten the tray of liquids this morning. I stated that I had just finished a different laparoscopic appendectomy for acute appendicitis, and the schedulers were getting me another room to follow a different surgeon due to OR and anesthesia availability. I admitted that there was anesthesia and OR availability issues last night which precluded me from going to the OR immediately due to an endoscopy case for a food bolus, therefore I treated him with IV antibiotics until I could proceed with surgery this morning. I also stated that we sometimes treat appendicitis with antibiotics alone, so if he were to leave against medical advice, I would at least recommend he allow  "me to prescribe him outpatient antibiotics. In his case, that would not be my upfront recommendation as he has an appendicolith and antibiotics have a higher rate of failure in these cases.    After this discussion, patient was not willing to listen to my rationale, and accused me of prioritizing other patients instead of him and \"treating him like cattle.\" He also was upset that antibiotics were not presented as an initial option as he would not have stayed overnight if that was offered. As noted above, given the appendicolith, I did not feel that would not have been my top recommendation.     During this conversation, patient got dressed and angrily left room with IV in place saying he \"was not leaving AMA\" and that he would \"take out [his] IV when he felt like it.\" His wife was still in the room, who I encouraged to take him to  for evaluation, but if this was not something they were willing to do, I would at least send in antibiotics for outpatient treatment. I again counseled her on returning to the hospital if his symptoms did not improve as he did have evidence of the appendicolith on scan making antibiotics less likely to result in resolution of his symptoms.     His nurse Araseli Hodges RN was present for this conversation. The charge nurse, Tianna Rey, was notified, as were the house supervisor and security. AMA paperwork was filled out but not completed by patient. Patient was contacted and was able to be convinced by wife to come back to floor and get IV removed.     I have sent a 7-day course of Augmentin to the requested pharmacy should the patient decide he does not want to go to the [facility].     Bertin Brito MD        "

## 2024-03-11 NOTE — H&P
General Surgery History and Physical    Nica Erickson MD    Paul Vargas  [id number]  1975    Date of Service: 3/11/2024    Reason for Admission: Acute appendicitis       History of Present Illness:Mr. Paul Vargas is a 48 year old gentleman with an otherwise unremarkable PMH presenting with a 2 day history of periumbilical pain which eventually localized to the RLQ. He states that initially he attributed this to food and was non-specific and generalized, however, it failed to improve and localized to the RLQ over the past day. He has had some associated nausea, chills, anorexia, and diarrhea, however he states his loose stool is chronic. Denies vomiting, dysuria. Movement and palpation make the pain worse and staying still make it better. No prior abdominal surgeries.     Labs showed a white count of 12.5, but were otherwise unremarkable. U/A was normal.     CT A/P showed acute uncomplicated appendicitis with an appendicolith proximally.    Surgery was consulted for further evaluation and treatment.      History reviewed. No pertinent past medical history.    No Known Allergies    No current facility-administered medications on file prior to encounter.     Current Outpatient Medications on File Prior to Encounter   Medication Sig Dispense Refill    methocarbamol (ROBAXIN) 750 MG tablet Take 1 tablet by mouth 3 (Three) Times a Day As Needed for Muscle Spasms. 15 tablet 0    ibuprofen (ADVIL,MOTRIN) 800 MG tablet Take 1 tablet by mouth 3 (Three) Times a Day With Meals. 15 tablet 0         Current Facility-Administered Medications:     acetaminophen (TYLENOL) tablet 650 mg, 650 mg, Oral, Q4H PRN, Bertin Brito MD    sennosides-docusate (PERICOLACE) 8.6-50 MG per tablet 2 tablet, 2 tablet, Oral, BID PRN **AND** polyethylene glycol (MIRALAX) packet 17 g, 17 g, Oral, Daily PRN **AND** bisacodyl (DULCOLAX) EC tablet 5 mg, 5 mg, Oral, Daily PRN **AND** bisacodyl (DULCOLAX) suppository 10 mg, 10 mg,  Rectal, Daily PRN, Bertin Brito MD    HYDROmorphone (DILAUDID) injection 0.5 mg, 0.5 mg, Intravenous, Q2H PRN **AND** naloxone (NARCAN) injection 0.4 mg, 0.4 mg, Intravenous, Q5 Min PRN, Bertin Brito MD    ibuprofen (ADVIL,MOTRIN) tablet 800 mg, 800 mg, Oral, TID With Meals, Bertin Brito MD    lactated ringers infusion, 100 mL/hr, Intravenous, Continuous, Bertin Brito MD, Last Rate: 100 mL/hr at 03/11/24 0523, 100 mL/hr at 03/11/24 0523    methocarbamol (ROBAXIN) tablet 750 mg, 750 mg, Oral, TID PRN, Bertin Brito MD    ondansetron ODT (ZOFRAN-ODT) disintegrating tablet 4 mg, 4 mg, Oral, Q6H PRN **OR** ondansetron (ZOFRAN) injection 4 mg, 4 mg, Intravenous, Q6H PRN, Bertin Brito MD    sodium chloride 0.9 % flush 10 mL, 10 mL, Intravenous, Q12H, Bertin Brito MD    sodium chloride 0.9 % flush 10 mL, 10 mL, Intravenous, PRN, Bertin Brito MD    sodium chloride 0.9 % infusion 40 mL, 40 mL, Intravenous, PRN, Bertin Brito MD    traMADol (ULTRAM) tablet 50 mg, 50 mg, Oral, Q6H PRN, Bertin Brito MD    Past Surgical History:    ELBOW PERCUTANEOUS PINNING    [facility]       Family History   Problem Relation Age of Onset    Fibromyalgia Mother     Heart disease Mother     Hypertension Mother     Cancer Father     Drug abuse Sister      Social History     Socioeconomic History    Marital status:    Tobacco Use    Smoking status: Every Day     Current packs/day: 1.00     Average packs/day: 1 pack/day for 24.2 years (24.2 ttl pk-yrs)     Types: Cigarettes     Start date: 2000    Smokeless tobacco: Never   Vaping Use    Vaping status: Never Used   Substance and Sexual Activity    Alcohol use: Never    Drug use: Yes     Types: Marijuana     Comment: recreational     Sexual activity: Yes       Review of Systems:  Per HPI, otherwise the 12 point review of systems is negative.    /67 (BP Location: Left arm, Patient Position: Lying)   Pulse 67   Temp 98 °F (36.7 °C) (Oral)   Resp  "18   Ht 172.7 cm (68\")   Wt 79.5 kg (175 lb 3.2 oz)   SpO2 92%   BMI 26.64 kg/m²   Body mass index is 26.64 kg/m².    General: alert, oriented x 3, well-appearing, no acute distress  HEENT: normocephalic, atraumatic, sclera anicteric, external ears normal   Cardiovascular: Regular rate and rhythm  Pulmonary: breathing comfortably on room air, no respiratory distress  Gastrointestinal: soft, tender to palpation with guarding in the RLQ, non-distended, no prior surgical scars  Extremity: no clubbing, cyanosis, edema  Neuro: cognitively intact, CN grossly intact, no focal deficits  Psych: appropriate mood and affect    CBC  Results from last 7 days   Lab Units 03/10/24  2032   WBC 10*3/mm3 12.47*   HEMOGLOBIN g/dL 14.7   HEMATOCRIT % 43.5   PLATELETS 10*3/mm3 240       CMP  Results from last 7 days   Lab Units 03/10/24  2032   SODIUM mmol/L 136   POTASSIUM mmol/L 3.6   CHLORIDE mmol/L 100   CO2 mmol/L 26.0   BUN mg/dL 11   CREATININE mg/dL 0.79   CALCIUM mg/dL 8.8   BILIRUBIN mg/dL 0.5   ALK PHOS U/L 92   ALT (SGPT) U/L 6   AST (SGOT) U/L 10   GLUCOSE mg/dL 117*       Radiology  Imaging Results (Last 72 Hours)       Procedure Component Value Units Date/Time    CT Abdomen Pelvis With Contrast [[id number]] Collected: 03/10/24 2111     Updated: 03/10/24 2116    Narrative:      CT ABDOMEN PELVIS W CONTRAST    Date of Exam: 3/10/2024 9:02 PM EDT    Indication: RLQ abd pain, nausea, chills, r/o appendicitis.    Comparison: None available.    Technique: Axial CT images were obtained of the abdomen and pelvis following the uneventful intravenous administration of intravenous contrast. Reconstructed coronal and sagittal images were also obtained. Automated exposure control and iterative   construction methods were used.      Findings:  Lung Bases:     The visualized lung bases and lower mediastinal structures are unremarkable.    Liver:  Liver is normal in size and CT density. No focal lesions.    Biliary/Gallbladder:    The " gallbladder is normal without evidence of radiopaque stones. The biliary tree is nondilated.    Spleen:  Spleen is normal in size and CT density.    Pancreas:    Pancreas is normal. There is no evidence of pancreatic mass or peripancreatic fluid.    Kidneys:    Kidneys are normal in size. There are no stones or hydronephrosis.    Adrenals:    Adrenal glands are unremarkable.    Retroperitoneal/Lymph Nodes/Vasculature:    No retroperitoneal adenopathy is identified.    Gastrointestinal/Mesentery:    The bowel loops are non-dilated without wall thickening or mass. The appendix appears dilated measuring up to 1.1 cm (series 2 image 87). Stranding is seen within the adjacent fat. Wall thickening is present. An appendicolith is present proximally. No   evidence of perforation. No evidence of hernia. No significant stool burden identified.. No evidence of obstruction. No free air. No mesenteric fluid collections identified.    Bladder:    The bladder is normal.    Genital:     Unremarkable          Bony Structures:     Visualized bony structures are consistent with the patient's age.        Impression:      Impression:  1.Acute uncomplicated appendicitis.  2.Ancillary findings as described above.        Electronically Signed: Neetu Andrade MD    3/10/2024 9:13 PM EDT    Workstation ID: [id number]              Assessment  Mr. Paul Vargas is a 48 year old man presenting with clinical and radiographic evidence of acute uncomplicated appendicitis with an appendicolith. I have recommended proceeding with laparoscopic appendectomy. Patient was admitted to the hospital in the meantime with IV antibiotics and made NPO after midnight.     The risks and benefits of the procedure were discussed including, but not limited to: bleeding, infection, injury to adjacent structures, need for re-intervention (operative intervention, drain placement, etc.), and medical complications due to the patient's underlying co-morbidities and the risks  of general anesthesia. The risks of conversion to open and ileocecectomy were reviewed which are specific to this procedure.     The patient wishes to proceed and consented for surgery. All of the patient's questions were answered.       Plan:  - Laparoscopic appendectomy this AM      Bertin Brito MD  03/11/24  09:14 EDT

## 2024-03-15 LAB
BACTERIA SPEC AEROBE CULT: NORMAL
BACTERIA SPEC AEROBE CULT: NORMAL

## 2024-09-12 PROCEDURE — 99285 EMERGENCY DEPT VISIT HI MDM: CPT

## 2024-09-13 ENCOUNTER — HOSPITAL ENCOUNTER (EMERGENCY)
Facility: HOSPITAL | Age: 49
Discharge: HOME | End: 2024-09-13
Attending: EMERGENCY MEDICINE
Payer: COMMERCIAL

## 2024-09-13 ENCOUNTER — APPOINTMENT (OUTPATIENT)
Dept: GENERAL RADIOLOGY | Facility: HOSPITAL | Age: 49
End: 2024-09-13
Payer: COMMERCIAL

## 2024-09-13 ENCOUNTER — APPOINTMENT (OUTPATIENT)
Dept: CT IMAGING | Facility: HOSPITAL | Age: 49
End: 2024-09-13
Payer: COMMERCIAL

## 2024-09-13 VITALS
TEMPERATURE: 98.3 F | HEIGHT: 68 IN | BODY MASS INDEX: 25.01 KG/M2 | HEART RATE: 66 BPM | OXYGEN SATURATION: 95 % | RESPIRATION RATE: 18 BRPM | DIASTOLIC BLOOD PRESSURE: 75 MMHG | SYSTOLIC BLOOD PRESSURE: 111 MMHG | WEIGHT: 165 LBS

## 2024-09-13 DIAGNOSIS — R07.9 CHEST PAIN, UNSPECIFIED TYPE: Primary | ICD-10-CM

## 2024-09-13 LAB
ALBUMIN SERPL-MCNC: 4.1 G/DL (ref 3.5–5.2)
ALBUMIN/GLOB SERPL: 1.4 G/DL
ALP SERPL-CCNC: 94 U/L (ref 39–117)
ALT SERPL W P-5'-P-CCNC: 10 U/L (ref 1–41)
ANION GAP SERPL CALCULATED.3IONS-SCNC: 10 MMOL/L (ref 5–15)
AST SERPL-CCNC: 12 U/L (ref 1–40)
BASOPHILS # BLD AUTO: 0.06 10*3/MM3 (ref 0–0.2)
BASOPHILS NFR BLD AUTO: 0.6 % (ref 0–1.5)
BILIRUB SERPL-MCNC: 0.2 MG/DL (ref 0–1.2)
BUN SERPL-MCNC: 21 MG/DL (ref 6–20)
BUN/CREAT SERPL: 24.4 (ref 7–25)
CALCIUM SPEC-SCNC: 9.6 MG/DL (ref 8.6–10.5)
CHLORIDE SERPL-SCNC: 100 MMOL/L (ref 98–107)
CO2 SERPL-SCNC: 26 MMOL/L (ref 22–29)
CREAT SERPL-MCNC: 0.86 MG/DL (ref 0.76–1.27)
D DIMER PPP FEU-MCNC: <0.27 MCGFEU/ML (ref 0–0.5)
DEPRECATED RDW RBC AUTO: 43.1 FL (ref 37–54)
EGFRCR SERPLBLD CKD-EPI 2021: 107.5 ML/MIN/1.73
EOSINOPHIL # BLD AUTO: 0.33 10*3/MM3 (ref 0–0.4)
EOSINOPHIL NFR BLD AUTO: 3.2 % (ref 0.3–6.2)
ERYTHROCYTE [DISTWIDTH] IN BLOOD BY AUTOMATED COUNT: 12.8 % (ref 12.3–15.4)
GEN 5 2HR TROPONIN T REFLEX: <6 NG/L
GLOBULIN UR ELPH-MCNC: 2.9 GM/DL
GLUCOSE SERPL-MCNC: 74 MG/DL (ref 65–99)
HCT VFR BLD AUTO: 43 % (ref 37.5–51)
HGB BLD-MCNC: 14.5 G/DL (ref 13–17.7)
HOLD SPECIMEN: NORMAL
IMM GRANULOCYTES # BLD AUTO: 0.04 10*3/MM3 (ref 0–0.05)
IMM GRANULOCYTES NFR BLD AUTO: 0.4 % (ref 0–0.5)
LIPASE SERPL-CCNC: 30 U/L (ref 13–60)
LYMPHOCYTES # BLD AUTO: 4.28 10*3/MM3 (ref 0.7–3.1)
LYMPHOCYTES NFR BLD AUTO: 41 % (ref 19.6–45.3)
MCH RBC QN AUTO: 30.9 PG (ref 26.6–33)
MCHC RBC AUTO-ENTMCNC: 33.7 G/DL (ref 31.5–35.7)
MCV RBC AUTO: 91.7 FL (ref 79–97)
MONOCYTES # BLD AUTO: 0.81 10*3/MM3 (ref 0.1–0.9)
MONOCYTES NFR BLD AUTO: 7.8 % (ref 5–12)
NEUTROPHILS NFR BLD AUTO: 4.93 10*3/MM3 (ref 1.7–7)
NEUTROPHILS NFR BLD AUTO: 47 % (ref 42.7–76)
NRBC BLD AUTO-RTO: 0 /100 WBC (ref 0–0.2)
NT-PROBNP SERPL-MCNC: <36 PG/ML (ref 0–450)
PLATELET # BLD AUTO: 260 10*3/MM3 (ref 140–450)
PMV BLD AUTO: 9.5 FL (ref 6–12)
POTASSIUM SERPL-SCNC: 4.9 MMOL/L (ref 3.5–5.2)
PROT SERPL-MCNC: 7 G/DL (ref 6–8.5)
QT INTERVAL: 326 MS
QT INTERVAL: 350 MS
QT INTERVAL: 368 MS
QTC INTERVAL: 409 MS
QTC INTERVAL: 410 MS
QTC INTERVAL: 432 MS
RBC # BLD AUTO: 4.69 10*6/MM3 (ref 4.14–5.8)
SODIUM SERPL-SCNC: 136 MMOL/L (ref 136–145)
TROPONIN T DELTA: NORMAL
TROPONIN T SERPL HS-MCNC: <6 NG/L
WBC NRBC COR # BLD AUTO: 10.45 10*3/MM3 (ref 3.4–10.8)
WHOLE BLOOD HOLD COAG: NORMAL
WHOLE BLOOD HOLD SPECIMEN: NORMAL

## 2024-09-13 PROCEDURE — 36415 COLL VENOUS BLD VENIPUNCTURE: CPT

## 2024-09-13 PROCEDURE — 25010000002 ONDANSETRON PER 1 MG

## 2024-09-13 PROCEDURE — 83690 ASSAY OF LIPASE: CPT | Performed by: EMERGENCY MEDICINE

## 2024-09-13 PROCEDURE — 25010000002 KETOROLAC TROMETHAMINE PER 15 MG: Performed by: EMERGENCY MEDICINE

## 2024-09-13 PROCEDURE — 71045 X-RAY EXAM CHEST 1 VIEW: CPT

## 2024-09-13 PROCEDURE — 93005 ELECTROCARDIOGRAM TRACING: CPT

## 2024-09-13 PROCEDURE — 25510000001 IOPAMIDOL PER 1 ML: Performed by: EMERGENCY MEDICINE

## 2024-09-13 PROCEDURE — 85025 COMPLETE CBC W/AUTO DIFF WBC: CPT | Performed by: EMERGENCY MEDICINE

## 2024-09-13 PROCEDURE — 74174 CTA ABD&PLVS W/CONTRAST: CPT

## 2024-09-13 PROCEDURE — 71275 CT ANGIOGRAPHY CHEST: CPT

## 2024-09-13 PROCEDURE — 80053 COMPREHEN METABOLIC PANEL: CPT | Performed by: EMERGENCY MEDICINE

## 2024-09-13 PROCEDURE — 93005 ELECTROCARDIOGRAM TRACING: CPT | Performed by: EMERGENCY MEDICINE

## 2024-09-13 PROCEDURE — 84484 ASSAY OF TROPONIN QUANT: CPT | Performed by: EMERGENCY MEDICINE

## 2024-09-13 PROCEDURE — 96375 TX/PRO/DX INJ NEW DRUG ADDON: CPT

## 2024-09-13 PROCEDURE — 83880 ASSAY OF NATRIURETIC PEPTIDE: CPT | Performed by: EMERGENCY MEDICINE

## 2024-09-13 PROCEDURE — 85379 FIBRIN DEGRADATION QUANT: CPT | Performed by: EMERGENCY MEDICINE

## 2024-09-13 PROCEDURE — 96374 THER/PROPH/DIAG INJ IV PUSH: CPT

## 2024-09-13 PROCEDURE — 25010000002 MORPHINE PER 10 MG: Performed by: EMERGENCY MEDICINE

## 2024-09-13 RX ORDER — IOPAMIDOL 755 MG/ML
79 INJECTION, SOLUTION INTRAVASCULAR
Status: COMPLETED | OUTPATIENT
Start: 2024-09-13 | End: 2024-09-13

## 2024-09-13 RX ORDER — ONDANSETRON 2 MG/ML
4 INJECTION INTRAMUSCULAR; INTRAVENOUS ONCE
Status: COMPLETED | OUTPATIENT
Start: 2024-09-13 | End: 2024-09-13

## 2024-09-13 RX ORDER — KETOROLAC TROMETHAMINE 15 MG/ML
15 INJECTION, SOLUTION INTRAMUSCULAR; INTRAVENOUS ONCE
Status: COMPLETED | OUTPATIENT
Start: 2024-09-13 | End: 2024-09-13

## 2024-09-13 RX ORDER — ONDANSETRON 2 MG/ML
INJECTION INTRAMUSCULAR; INTRAVENOUS
Status: COMPLETED
Start: 2024-09-13 | End: 2024-09-13

## 2024-09-13 RX ORDER — MORPHINE SULFATE 4 MG/ML
4 INJECTION, SOLUTION INTRAMUSCULAR; INTRAVENOUS ONCE
Status: COMPLETED | OUTPATIENT
Start: 2024-09-13 | End: 2024-09-13

## 2024-09-13 RX ORDER — SODIUM CHLORIDE 0.9 % (FLUSH) 0.9 %
10 SYRINGE (ML) INJECTION AS NEEDED
Status: DISCONTINUED | OUTPATIENT
Start: 2024-09-13 | End: 2024-09-13 | Stop reason: HOSPADM

## 2024-09-13 RX ORDER — ASPIRIN 81 MG/1
324 TABLET, CHEWABLE ORAL ONCE
Status: DISCONTINUED | OUTPATIENT
Start: 2024-09-13 | End: 2024-09-13 | Stop reason: HOSPADM

## 2024-09-13 RX ADMIN — KETOROLAC TROMETHAMINE 15 MG: 15 INJECTION, SOLUTION INTRAMUSCULAR; INTRAVENOUS at 02:10

## 2024-09-13 RX ADMIN — ONDANSETRON 4 MG: 2 INJECTION INTRAMUSCULAR; INTRAVENOUS at 01:26

## 2024-09-13 RX ADMIN — IOPAMIDOL 79 ML: 755 INJECTION, SOLUTION INTRAVENOUS at 01:41

## 2024-09-13 RX ADMIN — MORPHINE SULFATE 4 MG: 4 INJECTION, SOLUTION INTRAMUSCULAR; INTRAVENOUS at 01:25

## 2024-09-13 NOTE — ED PROVIDER NOTES
Subjective   History of Present Illness  Patient presents for evaluation of severe chest pain.  States he has had it on and off over the past couple days but became much more severe tonight.  He has pain with deep breathing.  It is a sharp pain.  He has never had anything like this before.  No fevers or chills, no nausea or vomiting, no urinary symptoms.    History provided by:  Patient      Review of Systems    No past medical history on file.    No Known Allergies    Past Surgical History:   Procedure Laterality Date    ELB[place] PERCUTANEOUS [place]       Family History   Problem Relation Age of Onset    Fibromyalgia Mother     Heart disease Mother     Hypertension Mother     Cancer Father     Drug abuse Sister        Social History     Socioeconomic History    Marital status:    Tobacco Use    Smoking status: Every Day     Current packs/day: 1.00     Average packs/day: 1 pack/day for 24.7 years (24.7 ttl pk-yrs)     Types: Cigarettes     Start date: 2000    Smokeless tobacco: Never   Vaping Use    Vaping status: Never Used   Substance and Sexual Activity    Alcohol use: Never    Drug use: Yes     Types: Marijuana     Comment: recreational     Sexual activity: Yes           Objective   Physical Exam  Constitutional:       General: He is in acute distress.   HENT:      Head: Normocephalic and atraumatic.   Eyes:      Conjunctiva/sclera: Conjunctivae normal.      Pupils: Pupils are equal, round, and reactive to light.   Cardiovascular:      Rate and Rhythm: Normal rate and regular rhythm.      Pulses: Normal pulses.      Heart sounds: No murmur heard.     No gallop.   Pulmonary:      Effort: Pulmonary effort is normal. No respiratory distress.      Breath sounds: No wheezing or rales.      Comments: Tender to the left anteroinferior chest wall  Chest:      Chest wall: Tenderness present.   Abdominal:      General: Abdomen is flat. There is no distension.      Tenderness:  There is no abdominal tenderness.   Musculoskeletal:         General: No swelling or deformity. Normal range of motion.   Skin:     General: Skin is warm and dry.      Capillary Refill: Capillary refill takes less than 2 seconds.   Neurological:      General: No focal deficit present.      Mental Status: He is alert and oriented to person, place, and time.   Psychiatric:         Mood and Affect: Mood normal.         Behavior: Behavior normal.         Procedures           ED Course  ED Course as of 09/13/24 0338   Fri Sep 13, 2024   0047 Twelve-lead ECG independently interpreted by myself demonstrates normal sinus rhythm, no ST segment elevation or depression.  Normal axis [KB]      ED Course User Index  [KB] Blayne Montoya MD                HEART Score: 1                              Medical Decision Making  Differential diagnosis includes acute coronary syndrome, aortic dissection, pulmonary embolism, pneumonia, pneumothorax.  Lab and imaging studies were conducted.  4 mg IV morphine given for pain control.  He was subsequently given 15 mg IV Toradol.  On reassessment patient has substantial improvement in symptoms.  Discussed lab and imaging tests, potential diagnosis including costochondritis, pleurisy.  He feels comfortable going home at this time.  He has normal vital signs.  He is able to achieve close follow-up with his primary doctor.  He was counseled on anti-inflammatory use, return precautions to the ER and discharged in good condition    Problems Addressed:  Chest pain, unspecified type: complicated acute illness or injury    Amount and/or Complexity of Data Reviewed  Independent Historian:      Details: Patient's wife at the details provide some details of HPI  External Data Reviewed: notes.     Details: 3/13/24 reviewed most recent discharge summary when patient was hospitalized for appendicitis  Labs: ordered. Decision-making details documented in ED Course.  Radiology: ordered and independent  interpretation performed. Decision-making details documented in ED Course.  ECG/medicine tests: ordered and independent interpretation performed. Decision-making details documented in ED Course.    Risk  OTC drugs.  Prescription drug management.  Parenteral controlled substances.  Decision regarding hospitalization.        Final diagnoses:   Chest pain, unspecified type       ED Disposition  ED Disposition       ED Disposition   Discharge    Condition   Stable    Comment   --             Recent Results (from the past 24 hour(s))   ECG 12 Lead ED Triage Standing Order; Chest Pain    Collection Time: 09/13/24 12:02 AM   Result Value Ref Range    QT Interval 326 ms    QTC Interval 409 ms   High Sensitivity Troponin T    Collection Time: 09/13/24 12:21 AM    Specimen: Arm, Left; Blood   Result Value Ref Range    HS Troponin T <6 <22 ng/L   Comprehensive Metabolic Panel    Collection Time: 09/13/24 12:21 AM    Specimen: Arm, Left; Blood   Result Value Ref Range    Glucose 74 65 - 99 mg/dL    BUN 21 (H) 6 - 20 mg/dL    Creatinine 0.86 0.76 - 1.27 mg/dL    Sodium 136 136 - 145 mmol/L    Potassium 4.9 3.5 - 5.2 mmol/L    Chloride 100 98 - 107 mmol/L    CO2 26.0 22.0 - 29.0 mmol/L    Calcium 9.6 8.6 - 10.5 mg/dL    Total Protein 7.0 6.0 - 8.5 g/dL    Albumin 4.1 3.5 - 5.2 g/dL    ALT (SGPT) 10 1 - 41 U/L    AST (SGOT) 12 1 - 40 U/L    Alkaline Phosphatase 94 39 - 117 U/L    Total Bilirubin 0.2 0.0 - 1.2 mg/dL    Globulin 2.9 gm/dL    A/G Ratio 1.4 g/dL    BUN/Creatinine Ratio 24.4 7.0 - 25.0    Anion Gap 10.0 5.0 - 15.0 mmol/L    eGFR 107.5 >60.0 mL/min/1.73   Lipase    Collection Time: 09/13/24 12:21 AM    Specimen: Arm, Left; Blood   Result Value Ref Range    Lipase 30 13 - 60 U/L   BNP    Collection Time: 09/13/24 12:21 AM    Specimen: Arm, Left; Blood   Result Value Ref Range    proBNP <36.0 0.0 - 450.0 pg/mL   Green Top (Gel)    Collection Time: 09/13/24 12:21 AM   Result Value Ref Range    Extra Tube Hold for add-ons.     Lavender Top    Collection Time: 09/13/24 12:21 AM   Result Value Ref Range    Extra Tube hold for add-on    Gold Top - SST    Collection Time: 09/13/24 12:21 AM   Result Value Ref Range    Extra Tube Hold for add-ons.    Gray Top    Collection Time: 09/13/24 12:21 AM   Result Value Ref Range    Extra Tube Hold for add-ons.    Light Blue Top    Collection Time: 09/13/24 12:21 AM   Result Value Ref Range    Extra Tube Hold for add-ons.    CBC Auto Differential    Collection Time: 09/13/24 12:21 AM    Specimen: Arm, Left; Blood   Result Value Ref Range    WBC 10.45 3.40 - 10.80 10*3/mm3    RBC 4.69 4.14 - 5.80 10*6/mm3    Hemoglobin 14.5 13.0 - 17.7 g/dL    Hematocrit 43.0 37.5 - 51.0 %    MCV 91.7 79.0 - 97.0 fL    MCH 30.9 26.6 - 33.0 pg    MCHC 33.7 31.5 - 35.7 g/dL    RDW 12.8 12.3 - 15.4 %    RDW-SD 43.1 37.0 - 54.0 fl    MPV 9.5 6.0 - 12.0 fL    Platelets 260 140 - 450 10*3/mm3    Neutrophil % 47.0 42.7 - 76.0 %    Lymphocyte % 41.0 19.6 - 45.3 %    Monocyte % 7.8 5.0 - 12.0 %    Eosinophil % 3.2 0.3 - 6.2 %    Basophil % 0.6 0.0 - 1.5 %    Immature Grans % 0.4 0.0 - 0.5 %    Neutrophils, Absolute 4.93 1.70 - 7.00 10*3/mm3    Lymphocytes, Absolute 4.28 (H) 0.70 - 3.10 10*3/mm3    Monocytes, Absolute 0.81 0.10 - 0.90 10*3/mm3    Eosinophils, Absolute 0.33 0.00 - 0.40 10*3/mm3    Basophils, Absolute 0.06 0.00 - 0.20 10*3/mm3    Immature Grans, Absolute 0.04 0.00 - 0.05 10*3/mm3    nRBC 0.0 0.0 - 0.2 /100 WBC   D-dimer, Quantitative    Collection Time: 09/13/24 12:21 AM    Specimen: Arm, Left; Blood   Result Value Ref Range    D-Dimer, Quantitative <0.27 0.00 - 0.50 MCGFEU/mL   ECG 12 Lead ED Triage Standing Order; Chest Pain    Collection Time: 09/13/24  1:20 AM   Result Value Ref Range    QT Interval 350 ms    QTC Interval 432 ms   High Sensitivity Troponin T 2Hr    Collection Time: 09/13/24  2:33 AM    Specimen: Blood   Result Value Ref Range    HS Troponin T <6 <22 ng/L    Troponin T Delta     ECG 12 Lead  Chest Pain    Collection Time: 09/13/24  2:36 AM   Result Value Ref Range    QT Interval 368 ms    QTC Interval 410 ms     Note: In addition to lab results from this visit, the labs listed above may include labs taken at another facility or during a different encounter within the last 24 hours. Please correlate lab times with ED admission and discharge times for further clarification of the services performed during this visit.    CT Angiogram Abdomen Pelvis   Final Result   Impression:   1.No acute vascular abnormality. No dissection or aneurysm.   2.No acute findings in the chest, abdomen or pelvis.   3.Mild emphysema.            Electronically Signed: Mahendra Porter MD     9/13/2024 2:45 AM EDT     Workstation ID: [id number]      XR Chest 1 View   Final Result   Impression:   Low lung volumes without superimposed acute process of the chest.            Electronically Signed: Mahendra Porter MD     9/13/2024 2:07 AM EDT     Workstation ID: [id number]      CT Angiogram Chest    (Results Pending)     Vitals:    09/13/24 0243 09/13/24 0245 09/13/24 0301 09/13/24 0316   BP: 120/78 120/78 123/83 111/75   Pulse: 75 63 64 66   Resp:       Temp:       TempSrc:       SpO2: 94% 93% 96% 95%   Weight:       Height:         Medications   sodium chloride 0.9 % flush 10 mL (has no administration in time range)   aspirin chewable tablet 324 mg (324 mg Oral Not Given 9/13/24 0115)   sodium chloride 0.9 % flush 10 mL (has no administration in time range)   Morphine sulfate (PF) injection 4 mg (4 mg Intravenous Given 9/13/24 0125)   ondansetron (ZOFRAN) injection 4 mg (4 mg Intravenous Given 9/13/24 0126)   iopamidol (ISOVUE-370) 76 % injection 79 mL (79 mL Intravenous Given 9/13/24 0141)   ketorolac (TORADOL) injection 15 mg (15 mg Intravenous Given 9/13/24 0210)     ECG/EMG Results (last 24 hours)       Procedure Component Value Units Date/Time    ECG 12 Lead ED Triage Standing Order; Chest Pain [[id number]] Collected: 09/13/24 0002      Updated: 09/13/24 0048     QT Interval 326 ms      QTC Interval 409 ms     Narrative:      Test Reason : ED Triage Standing Order~  Blood Pressure :   */*   mmHG  Vent. Rate :  95 BPM     Atrial Rate :  95 BPM     P-R Int : 168 ms          QRS Dur :  82 ms      QT Int : 326 ms       P-R-T Axes :  34  44  42 degrees     QTc Int : 409 ms    Normal sinus rhythm  Normal ECG  No previous ECGs available  Confirmed by MD MIRELES KYLE ([id number]) on 9/13/2024 12:48:04 AM    Referred By: EDMD           Confirmed By: AKHIL MIRELES MD    ECG 12 Lead ED Triage Standing Order; Chest Pain [[id number]] Collected: 09/13/24 0120     Updated: 09/13/24 0120     QT Interval 350 ms      QTC Interval 432 ms     Narrative:      Test Reason : ED Triage Standing Order~  Blood Pressure :   */*   mmHG  Vent. Rate :  92 BPM     Atrial Rate :  85 BPM     P-R Int :   * ms          QRS Dur :  94 ms      QT Int : 350 ms       P-R-T Axes :   *  63  59 degrees     QTc Int : 432 ms    Accelerated Junctional rhythm  Abnormal ECG  When compared with ECG of 13-SEP-2024 00:02,  Junctional rhythm has replaced Sinus rhythm    Referred By:            Confirmed By:     ECG 12 Lead Chest Pain [[id number]] Collected: 09/13/24 0236     Updated: 09/13/24 0236     QT Interval 368 ms      QTC Interval 410 ms     Narrative:      Test Reason : Chest Pain  Blood Pressure :   */*   mmHG  Vent. Rate :  75 BPM     Atrial Rate :  75 BPM     P-R Int : 164 ms          QRS Dur :  80 ms      QT Int : 368 ms       P-R-T Axes :  32  56  48 degrees     QTc Int : 410 ms    Normal sinus rhythm  Normal ECG  When compared with ECG of 13-SEP-2024 01:20, (Unconfirmed)  Sinus rhythm has replaced Junctional rhythm    Referred By:            Confirmed By:           ECG 12 Lead Chest Pain   Preliminary Result   Test Reason : Chest Pain   Blood Pressure :   */*   mmHG   Vent. Rate :  75 BPM     Atrial Rate :  75 BPM      P-R Int : 164 ms          QRS Dur :  80 ms       QT Int : 368 ms        P-R-T Axes :  32  56  48 degrees      QTc Int : 410 ms      Normal sinus rhythm   Normal ECG   When compared with ECG of 13-SEP-2024 01:20, (Unconfirmed)   Sinus rhythm has replaced Junctional rhythm      Referred By:            Confirmed By:       ECG 12 Lead ED Triage Standing Order; Chest Pain   Preliminary Result   Test Reason : ED Triage Standing Order~   Blood Pressure :   */*   mmHG   Vent. Rate :  92 BPM     Atrial Rate :  85 BPM      P-R Int :   * ms          QRS Dur :  94 ms       QT Int : 350 ms       P-R-T Axes :   *  63  59 degrees      QTc Int : 432 ms      Accelerated Junctional rhythm   Abnormal ECG   When compared with ECG of 13-SEP-2024 00:02,   Junctional rhythm has replaced Sinus rhythm      Referred By:            Confirmed By:       ECG 12 Lead ED Triage Standing Order; Chest Pain   Final Result   Test Reason : ED Triage Standing Order~   Blood Pressure :   */*   mmHG   Vent. Rate :  95 BPM     Atrial Rate :  95 BPM      P-R Int : 168 ms          QRS Dur :  82 ms       QT Int : 326 ms       P-R-T Axes :  34  44  42 degrees      QTc Int : 409 ms      Normal sinus rhythm   Normal ECG   No previous ECGs available   Confirmed by MD MIRELES KYLE ([id number]) on 9/13/2024 12:48:04 AM      Referred By: EDMD           Confirmed By: AKHIL MIRELES MD              No follow-up provider specified.       Medication List      No changes were made to your prescriptions during this visit.            Akhil Mireles MD  09/13/24 0338

## 2024-09-13 NOTE — DISCHARGE INSTRUCTIONS
A definitive cause for your chest pain was not identified in the emergency room tonight.  I believe pleurisy or costochondritis is a reasonable diagnosis given your description of the pain and exam findings. I recommend you take Tylenol 650 mg every 6 hours and ibuprofen 400 mg every 6 hours as needed for pain unless you have a contraindication to these medications.  Schedule a follow-up appointment with your primary doctor.  Return to the ER as needed for new or worsening symptoms

## 2024-09-18 LAB
QT INTERVAL: 350 MS
QT INTERVAL: 368 MS
QTC INTERVAL: 410 MS
QTC INTERVAL: 432 MS